# Patient Record
Sex: FEMALE | Race: BLACK OR AFRICAN AMERICAN | NOT HISPANIC OR LATINO
[De-identification: names, ages, dates, MRNs, and addresses within clinical notes are randomized per-mention and may not be internally consistent; named-entity substitution may affect disease eponyms.]

---

## 2017-11-09 ENCOUNTER — APPOINTMENT (OUTPATIENT)
Dept: SURGERY | Facility: CLINIC | Age: 82
End: 2017-11-09

## 2017-11-09 VITALS
BODY MASS INDEX: 33.74 KG/M2 | DIASTOLIC BLOOD PRESSURE: 75 MMHG | HEART RATE: 75 BPM | WEIGHT: 150 LBS | OXYGEN SATURATION: 95 % | HEIGHT: 56 IN | SYSTOLIC BLOOD PRESSURE: 132 MMHG | TEMPERATURE: 98.2 F

## 2017-12-05 RX ORDER — SODIUM CHLORIDE 9 MG/ML
3 INJECTION INTRAMUSCULAR; INTRAVENOUS; SUBCUTANEOUS EVERY 8 HOURS
Qty: 0 | Refills: 0 | Status: DISCONTINUED | OUTPATIENT
Start: 2017-12-06 | End: 2017-12-14

## 2017-12-06 ENCOUNTER — OUTPATIENT (OUTPATIENT)
Dept: OUTPATIENT SERVICES | Facility: HOSPITAL | Age: 82
LOS: 1 days | End: 2017-12-06
Payer: MEDICARE

## 2017-12-06 ENCOUNTER — TRANSCRIPTION ENCOUNTER (OUTPATIENT)
Age: 82
End: 2017-12-06

## 2017-12-06 ENCOUNTER — RESULT REVIEW (OUTPATIENT)
Age: 82
End: 2017-12-06

## 2017-12-06 VITALS
RESPIRATION RATE: 14 BRPM | TEMPERATURE: 98 F | DIASTOLIC BLOOD PRESSURE: 60 MMHG | HEART RATE: 85 BPM | SYSTOLIC BLOOD PRESSURE: 154 MMHG | OXYGEN SATURATION: 100 %

## 2017-12-06 VITALS
SYSTOLIC BLOOD PRESSURE: 158 MMHG | HEART RATE: 74 BPM | HEIGHT: 56 IN | TEMPERATURE: 98 F | RESPIRATION RATE: 18 BRPM | WEIGHT: 149.91 LBS | DIASTOLIC BLOOD PRESSURE: 53 MMHG | OXYGEN SATURATION: 96 %

## 2017-12-06 DIAGNOSIS — R22.32 LOCALIZED SWELLING, MASS AND LUMP, LEFT UPPER LIMB: ICD-10-CM

## 2017-12-06 DIAGNOSIS — Z01.818 ENCOUNTER FOR OTHER PREPROCEDURAL EXAMINATION: ICD-10-CM

## 2017-12-06 DIAGNOSIS — N20.9 URINARY CALCULUS, UNSPECIFIED: Chronic | ICD-10-CM

## 2017-12-06 LAB
GLUCOSE BLDC GLUCOMTR-MCNC: 105 MG/DL — HIGH (ref 70–99)
GLUCOSE BLDC GLUCOMTR-MCNC: 105 MG/DL — HIGH (ref 70–99)

## 2017-12-06 PROCEDURE — 12032 INTMD RPR S/A/T/EXT 2.6-7.5: CPT

## 2017-12-06 PROCEDURE — 25071 EXC FOREARM LES SC 3 CM/>: CPT

## 2017-12-06 PROCEDURE — 88304 TISSUE EXAM BY PATHOLOGIST: CPT | Mod: 26

## 2017-12-06 PROCEDURE — 11406 EXC TR-EXT B9+MARG >4.0 CM: CPT | Mod: LT

## 2017-12-06 PROCEDURE — 82962 GLUCOSE BLOOD TEST: CPT

## 2017-12-06 PROCEDURE — 25071 EXC FOREARM LES SC 3 CM/>: CPT | Mod: AS,LT

## 2017-12-06 RX ORDER — SITAGLIPTIN 50 MG/1
1 TABLET, FILM COATED ORAL
Qty: 0 | Refills: 0 | COMMUNITY

## 2017-12-06 RX ORDER — OMEPRAZOLE 10 MG/1
1 CAPSULE, DELAYED RELEASE ORAL
Qty: 0 | Refills: 0 | COMMUNITY

## 2017-12-06 RX ORDER — LEVOTHYROXINE SODIUM 125 MCG
1 TABLET ORAL
Qty: 0 | Refills: 0 | COMMUNITY

## 2017-12-06 RX ORDER — FUROSEMIDE 40 MG
1 TABLET ORAL
Qty: 0 | Refills: 0 | COMMUNITY

## 2017-12-06 RX ORDER — LOSARTAN POTASSIUM 100 MG/1
1 TABLET, FILM COATED ORAL
Qty: 0 | Refills: 0 | COMMUNITY

## 2017-12-06 RX ORDER — ALBUTEROL 90 UG/1
3 AEROSOL, METERED ORAL
Qty: 0 | Refills: 0 | COMMUNITY

## 2017-12-06 RX ORDER — ASPIRIN/CALCIUM CARB/MAGNESIUM 324 MG
81 TABLET ORAL
Qty: 0 | Refills: 0 | COMMUNITY

## 2017-12-06 RX ORDER — CALCIUM CARBONATE 500(1250)
0 TABLET ORAL
Qty: 0 | Refills: 0 | COMMUNITY

## 2017-12-06 RX ORDER — ALBUTEROL 90 UG/1
2 AEROSOL, METERED ORAL
Qty: 0 | Refills: 0 | COMMUNITY

## 2017-12-06 RX ORDER — ACETAMINOPHEN 500 MG
650 TABLET ORAL EVERY 6 HOURS
Qty: 0 | Refills: 0 | Status: DISCONTINUED | OUTPATIENT
Start: 2017-12-06 | End: 2017-12-14

## 2017-12-06 RX ORDER — FLUTICASONE FUROATE AND VILANTEROL TRIFENATATE 100; 25 UG/1; UG/1
1 POWDER RESPIRATORY (INHALATION)
Qty: 0 | Refills: 0 | COMMUNITY

## 2017-12-06 RX ORDER — MAGNESIUM CARBONATE 54 MG/5 ML
500 LIQUID (ML) ORAL
Qty: 0 | Refills: 0 | COMMUNITY

## 2017-12-06 RX ORDER — SIMVASTATIN 20 MG/1
1 TABLET, FILM COATED ORAL
Qty: 0 | Refills: 0 | COMMUNITY

## 2017-12-06 RX ORDER — ACETAMINOPHEN 500 MG
2 TABLET ORAL
Qty: 0 | Refills: 0 | COMMUNITY
Start: 2017-12-06

## 2017-12-06 NOTE — BRIEF OPERATIVE NOTE - PROCEDURE
<<-----Click on this checkbox to enter Procedure Excision of mass of left forearm  12/06/2017    Active  MAYU

## 2017-12-06 NOTE — ASU DISCHARGE PLAN (ADULT/PEDIATRIC). - MEDICATION SUMMARY - MEDICATIONS TO TAKE
I will START or STAY ON the medications listed below when I get home from the hospital:    Ecotrin  -- 81 milligram(s) by mouth once a day  -- Indication: For per PMD    acetaminophen 325 mg oral tablet  -- 2 tab(s) by mouth every 6 hours, As needed, Moderate Pain (4 - 6)  -- Indication: For Moderate pain    losartan 50 mg oral tablet  -- 1 tab(s) by mouth once a day  -- Indication: For per PMD    calcium (as carbonate) 500 mg oral tablet  -- orally once a day  -- Indication: For per PMD    Januvia 100 mg oral tablet  -- 1 tab(s) by mouth once a day  -- Indication: For per PMD    simvastatin 40 mg oral tablet  -- 1 tab(s) by mouth once a day (at bedtime)  -- Indication: For per PMD    Breo Ellipta 100 mcg-25 mcg/inh inhalation powder  -- 1 puff(s) inhaled once a day  -- Indication: For per PMD    ProAir HFA 90 mcg/inh inhalation aerosol  -- 2 puff(s) inhaled every 4 hours  -- Indication: For per PMD    albuterol 2.5 mg/3 mL (0.083%) inhalation solution  -- 3 milliliter(s) inhaled 3 times a day (after meals)  -- Indication: For per PMD    furosemide 20 mg oral tablet  -- 1 tab(s) by mouth once a day  -- Indication: For per PMD    magnesium carbonate  -- 500 milligram(s) by mouth once a day  -- Indication: For per PMD    omeprazole 40 mg oral delayed release capsule  -- 1 cap(s) by mouth once a day  -- Indication: For per PMD    levothyroxine 50 mcg (0.05 mg) oral tablet  -- 1 tab(s) by mouth once a day  -- Indication: For per PMD

## 2017-12-06 NOTE — ASU PATIENT PROFILE, ADULT - PMH
Asthma    Hypertension, unspecified type Asthma    COPD mixed type    Diabetes    GERD (gastroesophageal reflux disease)    Hypertension, unspecified type

## 2017-12-06 NOTE — H&P PST ADULT - HISTORY OF PRESENT ILLNESS
87yo female htn,dm,gerd,asthma,copd,arthritis  with longstanding arm mass. Pt for excision arm mass today. no c/o pain, no fever, no paresthesisa or plegias.

## 2017-12-06 NOTE — H&P PST ADULT - PMH
Asthma    COPD mixed type    Diabetes    GERD (gastroesophageal reflux disease)    Hypertension, unspecified type

## 2017-12-13 LAB — SURGICAL PATHOLOGY FINAL REPORT - CH: SIGNIFICANT CHANGE UP

## 2017-12-14 PROBLEM — Z86.79 HISTORY OF ESSENTIAL HYPERTENSION: Status: RESOLVED | Noted: 2017-12-14 | Resolved: 2017-12-14

## 2017-12-14 PROBLEM — Z87.19 HISTORY OF GASTROESOPHAGEAL REFLUX (GERD): Status: RESOLVED | Noted: 2017-12-14 | Resolved: 2017-12-14

## 2017-12-14 PROBLEM — Z87.09 HISTORY OF CHRONIC OBSTRUCTIVE LUNG DISEASE: Status: RESOLVED | Noted: 2017-12-14 | Resolved: 2017-12-14

## 2017-12-14 PROBLEM — Z87.09 HISTORY OF ASTHMA: Status: RESOLVED | Noted: 2017-12-14 | Resolved: 2017-12-14

## 2017-12-14 PROBLEM — Z86.39 HISTORY OF DIABETES MELLITUS: Status: RESOLVED | Noted: 2017-12-14 | Resolved: 2017-12-14

## 2017-12-14 PROBLEM — R22.30 ARM MASS: Status: RESOLVED | Noted: 2017-12-14 | Resolved: 2017-12-14

## 2017-12-14 PROBLEM — Z63.4 WIDOWED: Status: ACTIVE | Noted: 2017-12-14

## 2017-12-21 ENCOUNTER — APPOINTMENT (OUTPATIENT)
Dept: SURGERY | Facility: CLINIC | Age: 82
End: 2017-12-21
Payer: MEDICARE

## 2017-12-21 VITALS
HEIGHT: 56 IN | WEIGHT: 145 LBS | DIASTOLIC BLOOD PRESSURE: 71 MMHG | OXYGEN SATURATION: 93 % | TEMPERATURE: 98.3 F | SYSTOLIC BLOOD PRESSURE: 139 MMHG | HEART RATE: 79 BPM | BODY MASS INDEX: 32.62 KG/M2

## 2017-12-21 DIAGNOSIS — Z63.4 DISAPPEARANCE AND DEATH OF FAMILY MEMBER: ICD-10-CM

## 2017-12-21 DIAGNOSIS — Z86.79 PERSONAL HISTORY OF OTHER DISEASES OF THE CIRCULATORY SYSTEM: ICD-10-CM

## 2017-12-21 DIAGNOSIS — R22.30 LOCALIZED SWELLING, MASS AND LUMP, UNSPECIFIED UPPER LIMB: ICD-10-CM

## 2017-12-21 DIAGNOSIS — Z87.09 PERSONAL HISTORY OF OTHER DISEASES OF THE RESPIRATORY SYSTEM: ICD-10-CM

## 2017-12-21 DIAGNOSIS — Z86.39 PERSONAL HISTORY OF OTHER ENDOCRINE, NUTRITIONAL AND METABOLIC DISEASE: ICD-10-CM

## 2017-12-21 DIAGNOSIS — Z87.19 PERSONAL HISTORY OF OTHER DISEASES OF THE DIGESTIVE SYSTEM: ICD-10-CM

## 2017-12-21 PROCEDURE — 99024 POSTOP FOLLOW-UP VISIT: CPT

## 2017-12-21 SDOH — SOCIAL STABILITY - SOCIAL INSECURITY: DISSAPEARANCE AND DEATH OF FAMILY MEMBER: Z63.4

## 2020-02-25 ENCOUNTER — EMERGENCY (EMERGENCY)
Facility: HOSPITAL | Age: 85
LOS: 1 days | Discharge: HOME | End: 2020-02-25
Attending: EMERGENCY MEDICINE | Admitting: EMERGENCY MEDICINE
Payer: COMMERCIAL

## 2020-02-25 VITALS
TEMPERATURE: 100 F | SYSTOLIC BLOOD PRESSURE: 201 MMHG | HEIGHT: 58 IN | RESPIRATION RATE: 30 BRPM | WEIGHT: 153 LBS | OXYGEN SATURATION: 93 % | DIASTOLIC BLOOD PRESSURE: 76 MMHG | HEART RATE: 95 BPM

## 2020-02-25 VITALS
TEMPERATURE: 98 F | SYSTOLIC BLOOD PRESSURE: 141 MMHG | OXYGEN SATURATION: 96 % | RESPIRATION RATE: 18 BRPM | DIASTOLIC BLOOD PRESSURE: 69 MMHG | HEART RATE: 89 BPM

## 2020-02-25 DIAGNOSIS — J45.901 UNSPECIFIED ASTHMA WITH (ACUTE) EXACERBATION: ICD-10-CM

## 2020-02-25 DIAGNOSIS — Z91.013 ALLERGY TO SEAFOOD: ICD-10-CM

## 2020-02-25 DIAGNOSIS — R51 HEADACHE: ICD-10-CM

## 2020-02-25 DIAGNOSIS — I10 ESSENTIAL (PRIMARY) HYPERTENSION: ICD-10-CM

## 2020-02-25 DIAGNOSIS — R06.02 SHORTNESS OF BREATH: ICD-10-CM

## 2020-02-25 DIAGNOSIS — Z79.899 OTHER LONG TERM (CURRENT) DRUG THERAPY: ICD-10-CM

## 2020-02-25 DIAGNOSIS — Z79.51 LONG TERM (CURRENT) USE OF INHALED STEROIDS: ICD-10-CM

## 2020-02-25 DIAGNOSIS — Z88.5 ALLERGY STATUS TO NARCOTIC AGENT: ICD-10-CM

## 2020-02-25 DIAGNOSIS — E11.9 TYPE 2 DIABETES MELLITUS WITHOUT COMPLICATIONS: ICD-10-CM

## 2020-02-25 DIAGNOSIS — N20.9 URINARY CALCULUS, UNSPECIFIED: Chronic | ICD-10-CM

## 2020-02-25 DIAGNOSIS — Z79.4 LONG TERM (CURRENT) USE OF INSULIN: ICD-10-CM

## 2020-02-25 PROBLEM — K21.9 GASTRO-ESOPHAGEAL REFLUX DISEASE WITHOUT ESOPHAGITIS: Chronic | Status: ACTIVE | Noted: 2017-12-06

## 2020-02-25 PROBLEM — J45.909 UNSPECIFIED ASTHMA, UNCOMPLICATED: Chronic | Status: ACTIVE | Noted: 2017-12-06

## 2020-02-25 PROBLEM — J44.9 CHRONIC OBSTRUCTIVE PULMONARY DISEASE, UNSPECIFIED: Chronic | Status: ACTIVE | Noted: 2017-12-06

## 2020-02-25 LAB
ALBUMIN SERPL ELPH-MCNC: 3.8 G/DL — SIGNIFICANT CHANGE UP (ref 3.3–5)
ALP SERPL-CCNC: 47 U/L — SIGNIFICANT CHANGE UP (ref 40–120)
ALT FLD-CCNC: 8 U/L — LOW (ref 10–45)
ANION GAP SERPL CALC-SCNC: 12 MMOL/L — SIGNIFICANT CHANGE UP (ref 5–17)
APTT BLD: 24.2 SEC — LOW (ref 27.5–36.3)
AST SERPL-CCNC: 20 U/L — SIGNIFICANT CHANGE UP (ref 10–40)
BASE EXCESS BLDV CALC-SCNC: 3.6 MMOL/L — SIGNIFICANT CHANGE UP
BASOPHILS # BLD AUTO: 0.04 K/UL — SIGNIFICANT CHANGE UP (ref 0–0.2)
BASOPHILS NFR BLD AUTO: 0.4 % — SIGNIFICANT CHANGE UP (ref 0–2)
BILIRUB SERPL-MCNC: 0.3 MG/DL — SIGNIFICANT CHANGE UP (ref 0.2–1.2)
BUN SERPL-MCNC: 17 MG/DL — SIGNIFICANT CHANGE UP (ref 7–23)
CALCIUM SERPL-MCNC: 8.8 MG/DL — SIGNIFICANT CHANGE UP (ref 8.4–10.5)
CHLORIDE SERPL-SCNC: 100 MMOL/L — SIGNIFICANT CHANGE UP (ref 96–108)
CK SERPL-CCNC: 62 U/L — SIGNIFICANT CHANGE UP (ref 25–170)
CO2 SERPL-SCNC: 26 MMOL/L — SIGNIFICANT CHANGE UP (ref 22–31)
CREAT SERPL-MCNC: 1.04 MG/DL — SIGNIFICANT CHANGE UP (ref 0.5–1.3)
EOSINOPHIL # BLD AUTO: 0.06 K/UL — SIGNIFICANT CHANGE UP (ref 0–0.5)
EOSINOPHIL NFR BLD AUTO: 0.7 % — SIGNIFICANT CHANGE UP (ref 0–6)
GAS PNL BLDV: SIGNIFICANT CHANGE UP
GLUCOSE SERPL-MCNC: 106 MG/DL — HIGH (ref 70–99)
HCO3 BLDV-SCNC: 30 MMOL/L — HIGH (ref 20–27)
HCT VFR BLD CALC: 39.2 % — SIGNIFICANT CHANGE UP (ref 34.5–45)
HGB BLD-MCNC: 12.1 G/DL — SIGNIFICANT CHANGE UP (ref 11.5–15.5)
HMPV RNA SPEC QL NAA+PROBE: DETECTED
IMM GRANULOCYTES NFR BLD AUTO: 1.1 % — SIGNIFICANT CHANGE UP (ref 0–1.5)
INR BLD: 0.89 — SIGNIFICANT CHANGE UP (ref 0.88–1.16)
LACTATE SERPL-SCNC: 1.3 MMOL/L — SIGNIFICANT CHANGE UP (ref 0.5–2)
LYMPHOCYTES # BLD AUTO: 1.59 K/UL — SIGNIFICANT CHANGE UP (ref 1–3.3)
LYMPHOCYTES # BLD AUTO: 17.7 % — SIGNIFICANT CHANGE UP (ref 13–44)
MCHC RBC-ENTMCNC: 30.3 PG — SIGNIFICANT CHANGE UP (ref 27–34)
MCHC RBC-ENTMCNC: 30.9 GM/DL — LOW (ref 32–36)
MCV RBC AUTO: 98 FL — SIGNIFICANT CHANGE UP (ref 80–100)
MONOCYTES # BLD AUTO: 0.48 K/UL — SIGNIFICANT CHANGE UP (ref 0–0.9)
MONOCYTES NFR BLD AUTO: 5.3 % — SIGNIFICANT CHANGE UP (ref 2–14)
NEUTROPHILS # BLD AUTO: 6.71 K/UL — SIGNIFICANT CHANGE UP (ref 1.8–7.4)
NEUTROPHILS NFR BLD AUTO: 74.8 % — SIGNIFICANT CHANGE UP (ref 43–77)
NRBC # BLD: 0 /100 WBCS — SIGNIFICANT CHANGE UP (ref 0–0)
NT-PROBNP SERPL-SCNC: 1083 PG/ML — HIGH (ref 0–300)
PCO2 BLDV: 53 MMHG — HIGH (ref 41–51)
PH BLDV: 7.37 — SIGNIFICANT CHANGE UP (ref 7.32–7.43)
PLATELET # BLD AUTO: 142 K/UL — LOW (ref 150–400)
PO2 BLDV: 51 MMHG — SIGNIFICANT CHANGE UP
POTASSIUM SERPL-MCNC: 4.2 MMOL/L — SIGNIFICANT CHANGE UP (ref 3.5–5.3)
POTASSIUM SERPL-SCNC: 4.2 MMOL/L — SIGNIFICANT CHANGE UP (ref 3.5–5.3)
PROT SERPL-MCNC: 6.5 G/DL — SIGNIFICANT CHANGE UP (ref 6–8.3)
PROTHROM AB SERPL-ACNC: 10.1 SEC — SIGNIFICANT CHANGE UP (ref 10–12.9)
RAPID RVP RESULT: DETECTED
RBC # BLD: 4 M/UL — SIGNIFICANT CHANGE UP (ref 3.8–5.2)
RBC # FLD: 15.1 % — HIGH (ref 10.3–14.5)
SAO2 % BLDV: 86 % — SIGNIFICANT CHANGE UP
SODIUM SERPL-SCNC: 138 MMOL/L — SIGNIFICANT CHANGE UP (ref 135–145)
TROPONIN T SERPL-MCNC: <0.01 NG/ML — SIGNIFICANT CHANGE UP (ref 0–0.01)
TROPONIN T SERPL-MCNC: <0.01 NG/ML — SIGNIFICANT CHANGE UP (ref 0–0.01)
WBC # BLD: 8.98 K/UL — SIGNIFICANT CHANGE UP (ref 3.8–10.5)
WBC # FLD AUTO: 8.98 K/UL — SIGNIFICANT CHANGE UP (ref 3.8–10.5)

## 2020-02-25 PROCEDURE — 70450 CT HEAD/BRAIN W/O DYE: CPT

## 2020-02-25 PROCEDURE — 87581 M.PNEUMON DNA AMP PROBE: CPT

## 2020-02-25 PROCEDURE — 93010 ELECTROCARDIOGRAM REPORT: CPT

## 2020-02-25 PROCEDURE — 82550 ASSAY OF CK (CPK): CPT

## 2020-02-25 PROCEDURE — 96374 THER/PROPH/DIAG INJ IV PUSH: CPT

## 2020-02-25 PROCEDURE — 83605 ASSAY OF LACTIC ACID: CPT

## 2020-02-25 PROCEDURE — 87798 DETECT AGENT NOS DNA AMP: CPT

## 2020-02-25 PROCEDURE — 99285 EMERGENCY DEPT VISIT HI MDM: CPT | Mod: 25

## 2020-02-25 PROCEDURE — 87486 CHLMYD PNEUM DNA AMP PROBE: CPT

## 2020-02-25 PROCEDURE — 36415 COLL VENOUS BLD VENIPUNCTURE: CPT

## 2020-02-25 PROCEDURE — 85610 PROTHROMBIN TIME: CPT

## 2020-02-25 PROCEDURE — 71045 X-RAY EXAM CHEST 1 VIEW: CPT | Mod: 26

## 2020-02-25 PROCEDURE — 87040 BLOOD CULTURE FOR BACTERIA: CPT

## 2020-02-25 PROCEDURE — 84484 ASSAY OF TROPONIN QUANT: CPT

## 2020-02-25 PROCEDURE — 70450 CT HEAD/BRAIN W/O DYE: CPT | Mod: 26

## 2020-02-25 PROCEDURE — 85730 THROMBOPLASTIN TIME PARTIAL: CPT

## 2020-02-25 PROCEDURE — 93005 ELECTROCARDIOGRAM TRACING: CPT

## 2020-02-25 PROCEDURE — 83880 ASSAY OF NATRIURETIC PEPTIDE: CPT

## 2020-02-25 PROCEDURE — 96375 TX/PRO/DX INJ NEW DRUG ADDON: CPT

## 2020-02-25 PROCEDURE — 71045 X-RAY EXAM CHEST 1 VIEW: CPT

## 2020-02-25 PROCEDURE — 82803 BLOOD GASES ANY COMBINATION: CPT

## 2020-02-25 PROCEDURE — 87633 RESP VIRUS 12-25 TARGETS: CPT

## 2020-02-25 PROCEDURE — 94640 AIRWAY INHALATION TREATMENT: CPT

## 2020-02-25 PROCEDURE — 80053 COMPREHEN METABOLIC PANEL: CPT

## 2020-02-25 PROCEDURE — 85025 COMPLETE CBC W/AUTO DIFF WBC: CPT

## 2020-02-25 RX ORDER — MAGNESIUM SULFATE 500 MG/ML
2 VIAL (ML) INJECTION ONCE
Refills: 0 | Status: COMPLETED | OUTPATIENT
Start: 2020-02-25 | End: 2020-02-25

## 2020-02-25 RX ORDER — SODIUM CHLORIDE 9 MG/ML
1000 INJECTION INTRAMUSCULAR; INTRAVENOUS; SUBCUTANEOUS ONCE
Refills: 0 | Status: COMPLETED | OUTPATIENT
Start: 2020-02-25 | End: 2020-02-25

## 2020-02-25 RX ORDER — IPRATROPIUM/ALBUTEROL SULFATE 18-103MCG
3 AEROSOL WITH ADAPTER (GRAM) INHALATION ONCE
Refills: 0 | Status: COMPLETED | OUTPATIENT
Start: 2020-02-25 | End: 2020-02-25

## 2020-02-25 RX ORDER — ACETAMINOPHEN 500 MG
650 TABLET ORAL ONCE
Refills: 0 | Status: COMPLETED | OUTPATIENT
Start: 2020-02-25 | End: 2020-02-25

## 2020-02-25 RX ADMIN — Medication 3 MILLILITER(S): at 04:30

## 2020-02-25 RX ADMIN — Medication 3 MILLILITER(S): at 05:26

## 2020-02-25 RX ADMIN — Medication 50 GRAM(S): at 05:25

## 2020-02-25 RX ADMIN — Medication 650 MILLIGRAM(S): at 06:08

## 2020-02-25 RX ADMIN — Medication 3 MILLILITER(S): at 05:28

## 2020-02-25 RX ADMIN — Medication 125 MILLIGRAM(S): at 05:26

## 2020-02-25 RX ADMIN — SODIUM CHLORIDE 1000 MILLILITER(S): 9 INJECTION INTRAMUSCULAR; INTRAVENOUS; SUBCUTANEOUS at 05:27

## 2020-02-25 RX ADMIN — Medication 3 MILLILITER(S): at 09:16

## 2020-02-25 NOTE — ED ADULT NURSE NOTE - NSHOSCREENINGQ1_ED_ALL_ED
Patient reports that he was taking seroquel and wanted to change it, reports prescriber was a provider in [place] and that he has not established a PCP in [place] despite being here for several years. Discussed with patient that the ER was probably not the best place to go for medication management, as ER providers are not meant to follow up with patients. Explained that he would be referred to a PCP on leaving the ER and would need to establish care here in [place].   No

## 2020-02-25 NOTE — ED PROVIDER NOTE - PROGRESS NOTE DETAILS
VS improved. Wheezing slightly still but much improved from previous. pt signed out pending CT head, repeat trop, reassessment.  CT head negative, headache improved, wheezing is mild on exam, pt feeling better, RVP + for human metapneumovirus.  pt offered admission for continued nebs, steroids until respiratory status improves, doesn't want to stay, wants to leave with prednisone, pt is well appearing and breathing comfortably, will d/c with rx prednisone, continue albuterol, f/u pmd, return to ED if sx worsen.

## 2020-02-25 NOTE — ED PROVIDER NOTE - CLINICAL SUMMARY MEDICAL DECISION MAKING FREE TEXT BOX
88F with h/o htn ,dm, gerd, asthma, copd, arthritis, resection of arm mass in 2017 who p/w cough x 2 days, SOB and wheezing x 1 day, worsening tonight, pt noted have temp of 100.1, tachypneic, satting 93% and HTN to 200sys with diffuse b/l wheezes on arrival. EKg no ischemia, pt given nebs, steroids and mag for asthma/copd exacerbation with improvement in resp status. DDX: hypertensive urgency, r/o acs, r/o pna/flu/viral syncdrome. Pt also c/o new atraumatic right sided headache. Will get CT head to r/o ich or other intracranial abnormality. Dispo pending workup. if patient feels better and workup with no emergent findings, may DC.

## 2020-02-25 NOTE — ED PROVIDER NOTE - OBJECTIVE STATEMENT
88F with h/o htn ,dm, gerd, asthma, copd, arthritis, resection of arm mass in 2017 who p/w SOB 88F with h/o htn ,dm, gerd, asthma, copd, arthritis, resection of arm mass in 2017 who p/w cough x 2 days, SOB and wheezing x 1 day, worsening tonight. No relief with pump at home (last 6 hours ago) so came to the ER. Pt also c/o new right sided headache x 1 day, moderate to severe in intensity, denies hx of similar headache, no vision or speech change, no n/t/w in ext, no trauma or fall, not on blood thinners. No recent travel, abx or hospitalizations. no hx of intubation.

## 2020-02-25 NOTE — ED ADULT NURSE NOTE - OBJECTIVE STATEMENT
89 y/o female received into the ED, axox3, with complaints of SOB, wheezing, headache, and bilateral hip pain.  Pt. states that the pain started suddenly last night with no relief from Tylenol.  Pt. was noted to have wheezing through entire lung field.  Pt. states that she has coughing x1 week.  Pt. started on nebulizer treatments as per orders.  Also noted to have an increased work of breathing.

## 2020-02-25 NOTE — ED ADULT NURSE NOTE - NSIMPLEMENTINTERV_GEN_ALL_ED
Implemented All Universal Safety Interventions:  [place] to call system. Call bell, personal items and telephone within reach. Instruct patient to call for assistance. Room bathroom lighting operational. Non-slip footwear when patient is off stretcher. Physically safe environment: no spills, clutter or unnecessary equipment. Stretcher in lowest position, wheels locked, appropriate side rails in place.

## 2020-02-25 NOTE — ED PROVIDER NOTE - DIAGNOSTIC INTERPRETATION
ER Physician: Gloria Armenta   CHEST XRAY INTERPRETATION: possible trace left pleural effusion , no infiltrate, heart shadow normal, bony structures intact

## 2020-02-25 NOTE — ED PROVIDER NOTE - CARE PLAN
Principal Discharge DX:	Asthma exacerbation  Secondary Diagnosis:	HTN (hypertension)  Secondary Diagnosis:	Headache

## 2020-02-25 NOTE — ED PROVIDER NOTE - PATIENT PORTAL LINK FT
You can access the FollowMyHealth Patient Portal offered by [facility] by registering at the following website: [email or web address]. By joining [facility]’s FollowMyHealth portal, you will also be able to view your health information using other applications (apps) compatible with our system.

## 2020-02-25 NOTE — ED PROVIDER NOTE - PHYSICAL EXAMINATION
GEN: elderly, awake, alert, oriented to person, place, time/situation and in no apparent distress. Nontoxic  ENT: Airway patent, Nasal mucosa clear. Mouth with normal mucosa.  EYES: Clear bilaterally.  RESPIRATORY: mild tachypnea, bilateral exp wheezes, satting 93% on RA, no stridor  CARDIAC: Regular rate and rhythm, no m/r/g  ABDOMEN: Soft, nontender, +bowel sounds, no rebound, rigidity, or guarding.   MSK: Range of motion is not limited, no deformities noted. No edema.  NEURO: Alert and oriented x 3. Cn 2-12 intact. Strength 5/5 and sensation intact in all 4 extremities. Gait normal.   SKIN: Skin normal color for race, warm, dry and intact. No evidence of rash.  PSYCH: Alert and oriented to person, place, time/situation. normal mood and affect. no apparent risk to self or others.

## 2020-02-25 NOTE — ED ADULT NURSE NOTE - CHPI ED NUR SYMPTOMS POS
headache, bilateral hip pain./CHEST CONGESTION/WHEEZING/COUGH/DIFFICULTY BREATHING/SHORTNESS OF BREATH

## 2020-02-25 NOTE — ED PROVIDER NOTE - EKG #1 DATE/TIME
You can access the [facility] Patient Portal, offered by [facility], by registering with the following website: [email or web address] 25-Feb-2020 04:32

## 2020-03-01 LAB
CULTURE RESULTS: SIGNIFICANT CHANGE UP
CULTURE RESULTS: SIGNIFICANT CHANGE UP
SPECIMEN SOURCE: SIGNIFICANT CHANGE UP
SPECIMEN SOURCE: SIGNIFICANT CHANGE UP